# Patient Record
Sex: FEMALE | Race: WHITE | Employment: FULL TIME | URBAN - METROPOLITAN AREA
[De-identification: names, ages, dates, MRNs, and addresses within clinical notes are randomized per-mention and may not be internally consistent; named-entity substitution may affect disease eponyms.]

---

## 2021-05-04 ENCOUNTER — HOSPITAL ENCOUNTER (OUTPATIENT)
Dept: MAMMOGRAPHY | Age: 41
Discharge: HOME | End: 2021-05-09
Payer: COMMERCIAL

## 2021-05-04 VITALS — HEIGHT: 62 IN | BODY MASS INDEX: 24.84 KG/M2 | WEIGHT: 135 LBS

## 2021-05-04 DIAGNOSIS — Z12.31 VISIT FOR SCREENING MAMMOGRAM: ICD-10-CM

## 2021-05-04 PROCEDURE — 77067 SCR MAMMO BI INCL CAD: CPT

## 2021-05-14 ENCOUNTER — TELEPHONE (OUTPATIENT)
Dept: WOMENS IMAGING | Age: 41
End: 2021-05-14

## 2021-05-14 NOTE — TELEPHONE ENCOUNTER
The radiologist would like to repeat screening mammogram images from the mobile van mammogram patient had performed on 5/12/21. LVM for patient to call back to schedule.   Orestes Hernandez RN

## 2021-05-24 ENCOUNTER — TELEPHONE (OUTPATIENT)
Dept: WOMENS IMAGING | Age: 41
End: 2021-05-24

## 2021-05-24 NOTE — TELEPHONE ENCOUNTER
LVM explaining the request for repeat images.  LM for patient to call NN to schedule mammogram. Giulia Mayfield RN

## 2021-06-01 ENCOUNTER — TELEPHONE (OUTPATIENT)
Dept: WOMENS IMAGING | Age: 41
End: 2021-06-01